# Patient Record
Sex: MALE | Race: WHITE | NOT HISPANIC OR LATINO | URBAN - METROPOLITAN AREA
[De-identification: names, ages, dates, MRNs, and addresses within clinical notes are randomized per-mention and may not be internally consistent; named-entity substitution may affect disease eponyms.]

---

## 2019-07-30 ENCOUNTER — OFFICE VISIT - RIVER FALLS (OUTPATIENT)
Dept: FAMILY MEDICINE | Facility: CLINIC | Age: 24
End: 2019-07-30

## 2019-07-30 ASSESSMENT — MIFFLIN-ST. JEOR: SCORE: 2282.85

## 2022-02-11 VITALS
DIASTOLIC BLOOD PRESSURE: 88 MMHG | HEIGHT: 72 IN | SYSTOLIC BLOOD PRESSURE: 146 MMHG | WEIGHT: 280.6 LBS | HEART RATE: 104 BPM | BODY MASS INDEX: 38.01 KG/M2 | TEMPERATURE: 99.6 F

## 2022-02-16 NOTE — NURSING NOTE
Quick Intake Entered On:  7/30/2019 2:42 PM CDT    Performed On:  7/30/2019 2:41 PM CDT by Kristie Rosario MA               More Vitals   Systolic Blood Pressure Repeat :   146 mmHg   Diastolic Blood Pressure Repeat :   88 mmHg   BP Site Repeat :   Right arm   Kristie Rosario MA - 7/30/2019 2:41 PM CDT

## 2022-02-16 NOTE — PROGRESS NOTES
Patient:   CHENTE PRADHAN            MRN: [id number]            FIN: [id number]               Age:   23 years     Sex:  Male     :  1995   Associated Diagnoses:   Pre-employment examination   Author:   Hussain Yeh MD      Visit Information      Date of Service: 2019 02:00 pm  Performing Location: [facility]  Encounter#: [id number]      Primary Care Provider (PCP):  NONE ,       Chief Complaint   2019 2:08 PM CDT    preemployment px--[facility]      History of Present Illness             The patient presents for pre-employment exam.  The patient's general health status is described as good.  The patient's diet is described as balanced.  Exercise: routine.        Review of Systems   Constitutional:  No fever, No chills, No sweats, No weakness, No fatigue.    Eye:  No recent visual problem.    Ear/Nose/Mouth/Throat:  No decreased hearing, No nasal congestion, No sore throat.    Respiratory:  No shortness of breath, No cough.    Cardiovascular:  Negative, No chest pain, No palpitations, No peripheral edema.    Gastrointestinal:  No nausea, No vomiting, No diarrhea, No constipation, No heartburn.    Genitourinary:  No dysuria, No change in urine stream.    Hematology/Lymphatics:  No bruising tendency, No bleeding tendency.    Endocrine:  No cold intolerance, No heat intolerance.    Immunologic:  Negative.    Musculoskeletal:  No back pain, No neck pain, No joint pain, No muscle pain.    Integumentary:  No rash, No dryness, No skin lesion.    Neurologic:  Alert and oriented X4, No headache.    Psychiatric:  No anxiety, No depression.       Health Status   Allergies:    Allergic Reactions (Selected)  No Known Medication Allergies   Problem list:    All Problems  Obesity / SNOMED CT [id number] / Probable      Histories   Past Medical History:    No active or resolved past medical history items have been selected or recorded.   Family History:    No family history items have been selected or  recorded.   Procedure history:    No active procedure history items have been selected or recorded.   Social History:        Tobacco Assessment            Never (less than 100 in lifetime)      Physical Examination   Vital Signs   7/30/2019 2:08 PM CDT Temperature Tympanic 99.6 DegF    Peripheral Pulse Rate 104 bpm  HI    Pulse Site Radial artery    HR Method Manual    Systolic Blood Pressure 152 mmHg  HI    Diastolic Blood Pressure 72 mmHg    Mean Arterial Pressure 99 mmHg    BP Site Right arm    BP Method Manual      Measurements from flowsheet : Measurements   7/30/2019 2:08 PM CDT Height Measured - Standard 71.5 in    Weight Measured - Standard 280.6 lb    BSA 2.53 m2    Body Mass Index 38.59 kg/m2  HI      General:  Alert and oriented, No acute distress.    Eye:  Pupils are equal, round and reactive to light, Extraocular movements are intact, Normal conjunctiva.    HENT:  Normocephalic, Tympanic membranes are clear, Oral mucosa is moist, No pharyngeal erythema, No sinus tenderness.    Neck:  Supple, Non-tender, No carotid bruit, No lymphadenopathy, No thyromegaly.    Respiratory:  Lungs are clear to auscultation, Respirations are non-labored, Breath sounds are equal, No chest wall tenderness.    Cardiovascular:  Normal rate, Regular rhythm, No murmur, No gallop, Good pulses equal in all extremities, Normal peripheral perfusion, No edema.    Gastrointestinal:  Soft, Non-tender, Non-distended, Normal bowel sounds, No organomegaly.    Genitourinary:  No costovertebral angle tenderness.    Lymphatics:  WNL.    Musculoskeletal:  Normal range of motion, Normal strength, No tenderness, No swelling, No deformity.    Integumentary:  Warm, Dry, No rash.    Neurologic:  Alert, Oriented, Normal sensory, Normal motor function, No focal deficits.    Psychiatric:  Cooperative, Appropriate mood & affect.       Impression and Plan   Diagnosis     Pre-employment examination ([id number]-OG Z02.1).     Course:  Progressing as expected.     Plan:  forms filled out and signed.
